# Patient Record
Sex: MALE | Race: WHITE | NOT HISPANIC OR LATINO | Employment: UNEMPLOYED | URBAN - METROPOLITAN AREA
[De-identification: names, ages, dates, MRNs, and addresses within clinical notes are randomized per-mention and may not be internally consistent; named-entity substitution may affect disease eponyms.]

---

## 2022-01-31 ENCOUNTER — HOSPITAL ENCOUNTER (EMERGENCY)
Facility: HOSPITAL | Age: 32
Discharge: HOME | End: 2022-01-31
Attending: EMERGENCY MEDICINE
Payer: MEDICAID

## 2022-01-31 VITALS
OXYGEN SATURATION: 100 % | SYSTOLIC BLOOD PRESSURE: 127 MMHG | HEART RATE: 87 BPM | RESPIRATION RATE: 17 BRPM | DIASTOLIC BLOOD PRESSURE: 97 MMHG | TEMPERATURE: 98 F

## 2022-01-31 DIAGNOSIS — K08.89 TOOTH ACHE: Primary | ICD-10-CM

## 2022-01-31 PROCEDURE — 99283 EMERGENCY DEPT VISIT LOW MDM: CPT

## 2022-01-31 PROCEDURE — 25000003 PHARM REV CODE 250: Performed by: EMERGENCY MEDICINE

## 2022-01-31 RX ORDER — IBUPROFEN 600 MG/1
600 TABLET ORAL
Status: COMPLETED | OUTPATIENT
Start: 2022-01-31 | End: 2022-01-31

## 2022-01-31 RX ORDER — AMOXICILLIN AND CLAVULANATE POTASSIUM 875; 125 MG/1; MG/1
1 TABLET, FILM COATED ORAL 2 TIMES DAILY
Qty: 14 TABLET | Refills: 0 | Status: SHIPPED | OUTPATIENT
Start: 2022-01-31

## 2022-01-31 RX ADMIN — IBUPROFEN 600 MG: 600 TABLET, FILM COATED ORAL at 12:01

## 2022-01-31 NOTE — ED PROVIDER NOTES
SCRIBE #1 NOTE: I, Leticia Delgado, am scribing for, and in the presence of, Gaston Graf MD. I have scribed the entire note.       History   No chief complaint on file.    Review of patient's allergies indicates:  Not on File      History of Present Illness     HPI    1/31/2022, 12:30 AM  History obtained from the patient      History of Present Illness: Cortez Daniels is a 31 y.o. male patient who presents to the Emergency Department by EMS for evaluation of tooth pain which onset gradually 1 week ago. Pt reports that his first molar on the upper left side is hurting. Symptoms are constant and moderate in severity. No mitigating or exacerbating factors reported. No associated sxs. Patient denies any fever, chills, numbness, weakness, trouble swallowing, and all other sxs at this time. No further complaints or concerns at this time. Has not seen dentist yet.       Arrival mode: [facility]    PCP: Primary Doctor No        Past Medical History:  No past medical history on file.    Past Surgical History:  No past surgical history on file.      Family History:  No family history on file.    Social History:  Social History     Tobacco Use    Smoking status: Not on file    Smokeless tobacco: Not on file   Substance and Sexual Activity    Alcohol use: Not on file    Drug use: Not on file    Sexual activity: Not on file        Review of Systems     Review of Systems   Constitutional: Negative for chills and fever.   HENT: Positive for dental problem (Tooth pain). Negative for sore throat and trouble swallowing.    Respiratory: Negative for shortness of breath.    Cardiovascular: Negative for chest pain.   Gastrointestinal: Negative for nausea.   Genitourinary: Negative for dysuria.   Musculoskeletal: Negative for back pain.   Skin: Negative for rash.   Neurological: Negative for weakness and numbness.   Hematological: Does not bruise/bleed easily.   All other systems reviewed and are negative.       Physical Exam      Initial Vitals [01/31/22 0036]   BP Pulse Resp Temp SpO2   (!) 127/97 87 17 98 °F (36.7 °C) 100 %      MAP       --          Physical Exam  Nursing Notes and Vital Signs Reviewed.  Constitutional: Patient is in no acute distress. Well-developed and well-nourished.  Head: Atraumatic. Normocephalic.  Eyes: EOM intact. Conjunctivae are not pale. No scleral icterus.  ENT: Mucous membranes are moist. Oropharynx is clear and symmetric.    Mouth: No evident facial swelling. Patient handles secretions normally. First molar maxillary left side tender to percussion. Erosion down to the pulp. Negative for gingival edema. No palpable fluctuance. No evidence of periodontal or periapical abscess. No trismus.   Neck: Supple. Full ROM.   Cardiovascular: Regular rate. Regular rhythm. No murmurs, rubs, or gallops. Distal pulses are 2+ and symmetric.  Pulmonary/Chest: No respiratory distress. Clear to auscultation bilaterally. No wheezing or rales.  Abdominal: Soft and non-distended.  There is no tenderness.  No rebound, guarding, or rigidity.   Musculoskeletal: Moves all extremities. No obvious deformities. No edema.   Skin: Warm and dry.  Neurological:  Alert, awake, and appropriate.  Normal speech.  No acute focal neurological deficits are appreciated.  Psychiatric: Normal affect. Good eye contact. Appropriate in content.     ED Course   Procedures  ED Vital Signs:  Vitals:    01/31/22 0036   BP: (!) 127/97   Pulse: 87   Resp: 17   Temp: 98 °F (36.7 °C)   TempSrc: Oral   SpO2: 100%                  The Emergency Provider reviewed the vital signs and test results, which are outlined above.     ED Discussion       12:36 AM: Reassessed pt at this time. Discussed with pt all pertinent ED information and results. Discussed pt dx and plan of tx. Gave pt all f/u and return to the ED instructions. All questions and concerns were addressed at this time. Pt expresses understanding of information and instructions, and is comfortable with  plan to discharge. Pt is stable for discharge.    I discussed with patient and/or family/caretaker that evaluation in the ED does not suggest any emergent or life threatening medical conditions requiring immediate intervention beyond what was provided in the ED, and I believe patient is safe for discharge.  Regardless, an unremarkable evaluation in the ED does not preclude the development or presence of a serious of life threatening condition. As such, patient was instructed to return immediately for any worsening or change in current symptoms.                   ED Medication(s):  Medications   ibuprofen tablet 600 mg (600 mg Oral Given 1/31/22 0037)       Discharge Medication List as of 1/31/2022 12:32 AM      START taking these medications    Details   amoxicillin-clavulanate 875-125mg (AUGMENTIN) 875-125 mg per tablet Take 1 tablet by mouth 2 (two) times daily., Starting Mon 1/31/2022, Print              Follow-up Information     Your Dentist. Schedule an appointment as soon as possible for a visit in 2 days.    Why: For re-evaluation and further treatment           [facility] - Emergency Dept.. Go today.    Specialty: Emergency Medicine  Why: If symptoms worsen, For re-evaluation and further treatment, As needed  Contact information:  [id number] [place] [id number] [phone]                           Scribe Attestation:   Scribe #1: I performed the above scribed service and the documentation accurately describes the services I performed. I attest to the accuracy of the note.     Attending:   Physician Attestation Statement for Scribe #1: I, Gaston Graf MD, personally performed the services described in this documentation, as scribed by Leticia Delgado, in my presence, and it is both accurate and complete.           Clinical Impression       ICD-10-CM ICD-9-CM   1. Tooth ache  K08.89 525.9       Disposition:   Disposition: Discharged  Condition: Stable         Gaston Graf  MD  01/31/22 0209